# Patient Record
Sex: MALE | Race: WHITE | NOT HISPANIC OR LATINO | URBAN - METROPOLITAN AREA
[De-identification: names, ages, dates, MRNs, and addresses within clinical notes are randomized per-mention and may not be internally consistent; named-entity substitution may affect disease eponyms.]

---

## 2021-02-08 PROCEDURE — U0004 COV-19 TEST NON-CDC HGH THRU: HCPCS | Performed by: NURSE PRACTITIONER

## 2022-12-09 ENCOUNTER — PATIENT ROUNDING (BHMG ONLY) (OUTPATIENT)
Dept: FAMILY MEDICINE CLINIC | Facility: CLINIC | Age: 27
End: 2022-12-09

## 2022-12-09 ENCOUNTER — OFFICE VISIT (OUTPATIENT)
Dept: FAMILY MEDICINE CLINIC | Facility: CLINIC | Age: 27
End: 2022-12-09

## 2022-12-09 VITALS
OXYGEN SATURATION: 100 % | HEIGHT: 70 IN | SYSTOLIC BLOOD PRESSURE: 118 MMHG | WEIGHT: 148.4 LBS | DIASTOLIC BLOOD PRESSURE: 78 MMHG | BODY MASS INDEX: 21.24 KG/M2 | HEART RATE: 87 BPM

## 2022-12-09 DIAGNOSIS — Q24.9 CARDIAC ANOMALY, CONGENITAL: Primary | ICD-10-CM

## 2022-12-09 PROCEDURE — 99213 OFFICE O/P EST LOW 20 MIN: CPT | Performed by: NURSE PRACTITIONER

## 2022-12-09 NOTE — PROGRESS NOTES
"Chief Complaint  Heart Problem (Pt states hes suppose to have an ekg done every 5-6 years from where he had open heart surgery when he was younger)    Josiah Ramos presents to [facility]  History of Present Illness  Pt is here today to establish care. He reports that he had cardiac surgery at 6 months old and was advised to get an EKG every few years. He has not seen Cardiology in 5-6 years. Will refer today so that he can get established again. He does not have any other concerns today. He is not having cardiac symptoms; no chest pain, palpitations, shortness of breath, edema.       Objective   Vital Signs:  /78   Pulse 87   Ht 177.8 cm (70\")   Wt 67.3 kg (148 lb 6.4 oz)   SpO2 100%   BMI 21.29 kg/m²   Estimated body mass index is 21.29 kg/m² as calculated from the following:    Height as of this encounter: 177.8 cm (70\").    Weight as of this encounter: 67.3 kg (148 lb 6.4 oz).    BMI is within normal parameters. No other follow-up for BMI required.    Physical Exam  Vitals reviewed.   Constitutional:       Appearance: Normal appearance.   HENT:      Nose: Nose normal.      Mouth/Throat:      Mouth: Mucous membranes are moist.      Pharynx: Oropharynx is clear.   Eyes:      Conjunctiva/sclera: Conjunctivae normal.   Cardiovascular:      Rate and Rhythm: Normal rate and regular rhythm.      Heart sounds: Murmur heard.   Pulmonary:      Effort: Pulmonary effort is normal.      Breath sounds: Normal breath sounds.   Musculoskeletal:         General: Normal range of motion.      Cervical back: Normal range of motion and neck supple.   Skin:     General: Skin is warm.   Neurological:      Mental Status: He is alert and oriented to person, place, and time.   Psychiatric:         Mood and Affect: Mood normal.         Behavior: Behavior normal.         Thought Content: Thought content normal.        Result Review :                Assessment and Plan   Diagnoses and " all orders for this visit:    1. Cardiac anomaly, congenital (Primary)  -     Ambulatory Referral to [facility] [place] [facility] [place]             Follow Up   Return for Annual.  Patient was given instructions and counseling regarding his condition or for health maintenance advice. Please see specific information pulled into the AVS if appropriate.

## 2023-01-04 NOTE — PROGRESS NOTES
[facility]  [facility]    Chief Complaint  Congenital Heart Defect and Establish Care    Subjective    History of Present Illness {CC  Problem List  Visit  Diagnosis   Encounters  Notes  Medications  Labs  Result Review Imaging  Media :23}     Kyle Ramos is a 27 y.o. male with congenital heart defect s/p surgery at 6 months old (data deficit) who presents today to establish care.    He reports that he had open heart surgery as a baby for possible ASD or VSD and then had it \"patched\" at 6 months. He does not recall the details of this surgery. He believes he last saw Dr. Quick at [place] 5 or 6 years ago. He believes he had EKG 5 or 6 years ago. He reports he feels good and just wanted a check up. He works as a  and walks 10-11 miles a day. Denies shortness of breath, chest pain, palpitations, dizziness/lightheadess or syncope          Objective     Vital Signs:   Vitals:    01/05/23 0800 01/05/23 0802 01/05/23 0803   BP: 126/75 130/88 124/77   BP Location: Right arm Left arm Left arm   Patient Position: Sitting Standing Sitting   Cuff Size: Adult Adult Adult   Pulse: 72 76 75   Resp:   18   Temp:   97.6 °F (36.4 °C)   TempSrc:   Temporal   SpO2: 99% 100% 100%   Weight:   69.4 kg (153 lb)   Height:   177.8 cm (70\")     Body mass index is 21.95 kg/m².  Physical Exam  Vitals reviewed.   Constitutional:       Appearance: Normal appearance.   HENT:      Head: Normocephalic.   Neck:      Vascular: No carotid bruit.   Cardiovascular:      Rate and Rhythm: Normal rate. Rhythm regularly irregular.      Pulses: Normal pulses.      Heart sounds: S1 normal and S2 normal. Murmur heard.    Diastolic murmur is present with a grade of 2/4.     Comments: Sinus arrhythmia  Pulmonary:      Effort: Pulmonary effort is normal. No respiratory distress.      Breath sounds: Normal breath sounds.   Chest:      Chest wall: No tenderness.   Abdominal:      General: Abdomen is flat.       Palpations: Abdomen is soft.   Musculoskeletal:      Cervical back: Neck supple.      Right lower leg: No edema.      Left lower leg: No edema.   Skin:     General: Skin is warm and dry.   Neurological:      General: No focal deficit present.      Mental Status: He is alert and oriented to person, place, and time. Mental status is at baseline.   Psychiatric:         Mood and Affect: Mood normal.         Behavior: Behavior normal.         Thought Content: Thought content normal.              Result Review  Data Reviewed:{ Labs  Result Review  Imaging  Med Tab  Media :23}     EKG today shows normal sinus rhythm with sinus arrhythmia, left axis deviation, right bundle branch block, LVH with QRS widening           Assessment and Plan {CC Problem List  Visit Diagnosis  ROS  Review (Popup)  Health Maintenance  Quality  BestPractice  Medications  SmartSets  SnapShot Encounters  Media :23}   1. Congenital heart defect  -Unclear details, suspect VSD. Will try and find prior records  - ECG 12 Lead; Future  - Adult Transthoracic Echo Complete W/ Cont if Necessary Per Protocol; Future    2. Heart murmur    - Adult Transthoracic Echo Complete W/ Cont if Necessary Per Protocol; Future    3. Abnormal EKG  - Will try and obtain prior EKGS    Patient will need to be established back with Dr. Quick pending results of EKG        Follow Up {Instructions Charge Capture  Follow-up Communications :23}   Return if symptoms worsen or fail to improve.    Patient was given instructions and counseling regarding his condition or for health maintenance advice. Please see specific information pulled into the AVS if appropriate.  Advised to call the [facility] with any questions, concerns, or worsening symptoms.

## 2023-01-05 ENCOUNTER — OFFICE VISIT (OUTPATIENT)
Dept: CARDIOLOGY | Facility: HOSPITAL | Age: 28
End: 2023-01-05
Payer: COMMERCIAL

## 2023-01-05 ENCOUNTER — HOSPITAL ENCOUNTER (OUTPATIENT)
Dept: CARDIOLOGY | Facility: HOSPITAL | Age: 28
Discharge: HOME | End: 2023-01-05
Payer: COMMERCIAL

## 2023-01-05 VITALS
HEART RATE: 75 BPM | HEIGHT: 70 IN | SYSTOLIC BLOOD PRESSURE: 124 MMHG | RESPIRATION RATE: 18 BRPM | WEIGHT: 153 LBS | DIASTOLIC BLOOD PRESSURE: 77 MMHG | BODY MASS INDEX: 21.9 KG/M2 | TEMPERATURE: 97.6 F | OXYGEN SATURATION: 100 %

## 2023-01-05 DIAGNOSIS — Q24.9 CONGENITAL HEART DEFECT: Primary | ICD-10-CM

## 2023-01-05 DIAGNOSIS — Q24.9 CONGENITAL HEART DEFECT: ICD-10-CM

## 2023-01-05 DIAGNOSIS — R01.1 HEART MURMUR: ICD-10-CM

## 2023-01-05 DIAGNOSIS — R94.31 ABNORMAL EKG: ICD-10-CM

## 2023-01-05 PROCEDURE — 93010 ELECTROCARDIOGRAM REPORT: CPT | Performed by: INTERNAL MEDICINE

## 2023-01-05 PROCEDURE — 99213 OFFICE O/P EST LOW 20 MIN: CPT | Performed by: NURSE PRACTITIONER

## 2023-01-05 PROCEDURE — 93005 ELECTROCARDIOGRAM TRACING: CPT | Performed by: NURSE PRACTITIONER

## 2023-01-06 ENCOUNTER — APPOINTMENT (OUTPATIENT)
Dept: CARDIOLOGY | Facility: HOSPITAL | Age: 28
End: 2023-01-06
Payer: COMMERCIAL

## 2023-01-10 LAB
QT INTERVAL: 388 MS
QTC INTERVAL: 453 MS

## 2023-01-17 DIAGNOSIS — Q24.9 CONGENITAL HEART DEFECT: Primary | ICD-10-CM

## 2023-01-17 DIAGNOSIS — Z87.74 S/P VSD REPAIR: ICD-10-CM

## 2023-01-20 ENCOUNTER — HOSPITAL ENCOUNTER (OUTPATIENT)
Dept: CARDIOLOGY | Facility: HOSPITAL | Age: 28
Discharge: HOME | End: 2023-01-20
Payer: COMMERCIAL

## 2023-02-01 ENCOUNTER — DOCUMENTATION (OUTPATIENT)
Dept: CARDIOLOGY | Facility: HOSPITAL | Age: 28
End: 2023-02-01
Payer: COMMERCIAL

## 2023-02-01 NOTE — PROGRESS NOTES
RE: Cardiology referral    Yvonne Gomez RegSched Rep  Evelyn Johnson, HERMINIA  Patient has been called 3x unable to schedule will mail pt letter

## 2024-03-21 ENCOUNTER — TELEPHONE (OUTPATIENT)
Age: 29
End: 2024-03-21
Payer: COMMERCIAL